# Patient Record
Sex: MALE | Race: WHITE | NOT HISPANIC OR LATINO | Employment: FULL TIME | URBAN - METROPOLITAN AREA
[De-identification: names, ages, dates, MRNs, and addresses within clinical notes are randomized per-mention and may not be internally consistent; named-entity substitution may affect disease eponyms.]

---

## 2021-11-11 ENCOUNTER — TELEPHONE (OUTPATIENT)
Dept: UROLOGY | Facility: CLINIC | Age: 56
End: 2021-11-11
Payer: COMMERCIAL

## 2021-11-18 ENCOUNTER — OFFICE VISIT (OUTPATIENT)
Dept: UROLOGY | Facility: CLINIC | Age: 56
End: 2021-11-18
Payer: COMMERCIAL

## 2021-11-18 VITALS
SYSTOLIC BLOOD PRESSURE: 137 MMHG | DIASTOLIC BLOOD PRESSURE: 81 MMHG | BODY MASS INDEX: 20.7 KG/M2 | HEIGHT: 76 IN | WEIGHT: 170 LBS | HEART RATE: 76 BPM

## 2021-11-18 DIAGNOSIS — R35.0 URINARY FREQUENCY: ICD-10-CM

## 2021-11-18 DIAGNOSIS — R97.20 ELEVATED PSA: Primary | ICD-10-CM

## 2021-11-18 PROCEDURE — 3075F SYST BP GE 130 - 139MM HG: CPT | Mod: CPTII,S$GLB,, | Performed by: UROLOGY

## 2021-11-18 PROCEDURE — 3079F DIAST BP 80-89 MM HG: CPT | Mod: CPTII,S$GLB,, | Performed by: UROLOGY

## 2021-11-18 PROCEDURE — 3079F PR MOST RECENT DIASTOLIC BLOOD PRESSURE 80-89 MM HG: ICD-10-PCS | Mod: CPTII,S$GLB,, | Performed by: UROLOGY

## 2021-11-18 PROCEDURE — 51798 PR MEAS,POST-VOID RES,US,NON-IMAGING: ICD-10-PCS | Mod: S$GLB,,, | Performed by: UROLOGY

## 2021-11-18 PROCEDURE — 3075F PR MOST RECENT SYSTOLIC BLOOD PRESS GE 130-139MM HG: ICD-10-PCS | Mod: CPTII,S$GLB,, | Performed by: UROLOGY

## 2021-11-18 PROCEDURE — 99203 OFFICE O/P NEW LOW 30 MIN: CPT | Mod: S$GLB,,, | Performed by: UROLOGY

## 2021-11-18 PROCEDURE — 99203 PR OFFICE/OUTPT VISIT, NEW, LEVL III, 30-44 MIN: ICD-10-PCS | Mod: S$GLB,,, | Performed by: UROLOGY

## 2021-11-18 PROCEDURE — 1159F MED LIST DOCD IN RCRD: CPT | Mod: CPTII,S$GLB,, | Performed by: UROLOGY

## 2021-11-18 PROCEDURE — 81001 PR  URINALYSIS, AUTO, W/SCOPE: ICD-10-PCS | Mod: S$GLB,,, | Performed by: UROLOGY

## 2021-11-18 PROCEDURE — 3008F PR BODY MASS INDEX (BMI) DOCUMENTED: ICD-10-PCS | Mod: CPTII,S$GLB,, | Performed by: UROLOGY

## 2021-11-18 PROCEDURE — 3008F BODY MASS INDEX DOCD: CPT | Mod: CPTII,S$GLB,, | Performed by: UROLOGY

## 2021-11-18 PROCEDURE — 51798 US URINE CAPACITY MEASURE: CPT | Mod: S$GLB,,, | Performed by: UROLOGY

## 2021-11-18 PROCEDURE — 1159F PR MEDICATION LIST DOCUMENTED IN MEDICAL RECORD: ICD-10-PCS | Mod: CPTII,S$GLB,, | Performed by: UROLOGY

## 2021-11-18 PROCEDURE — 1160F PR REVIEW ALL MEDS BY PRESCRIBER/CLIN PHARMACIST DOCUMENTED: ICD-10-PCS | Mod: CPTII,S$GLB,, | Performed by: UROLOGY

## 2021-11-18 PROCEDURE — 81001 URINALYSIS AUTO W/SCOPE: CPT | Mod: S$GLB,,, | Performed by: UROLOGY

## 2021-11-18 PROCEDURE — 1160F RVW MEDS BY RX/DR IN RCRD: CPT | Mod: CPTII,S$GLB,, | Performed by: UROLOGY

## 2021-11-18 RX ORDER — DEXTROAMPHETAMINE SACCHARATE, AMPHETAMINE ASPARTATE, DEXTROAMPHETAMINE SULFATE AND AMPHETAMINE SULFATE 7.5; 7.5; 7.5; 7.5 MG/1; MG/1; MG/1; MG/1
1 TABLET ORAL 2 TIMES DAILY
COMMUNITY
Start: 2021-11-05

## 2021-11-18 RX ORDER — ZOLPIDEM TARTRATE 10 MG/1
10 TABLET ORAL NIGHTLY
COMMUNITY
Start: 2021-10-14

## 2021-11-18 RX ORDER — OMEPRAZOLE 40 MG/1
CAPSULE, DELAYED RELEASE ORAL
COMMUNITY
Start: 2021-08-12

## 2022-01-19 RX ORDER — CIPROFLOXACIN 500 MG/1
500 TABLET ORAL EVERY 12 HOURS
Qty: 8 TABLET | Refills: 0 | Status: SHIPPED | OUTPATIENT
Start: 2022-01-31 | End: 2022-02-04

## 2022-01-20 ENCOUNTER — TELEPHONE (OUTPATIENT)
Dept: UROLOGY | Facility: CLINIC | Age: 57
End: 2022-01-20
Payer: COMMERCIAL

## 2022-01-20 ENCOUNTER — CLINICAL SUPPORT (OUTPATIENT)
Dept: UROLOGY | Facility: CLINIC | Age: 57
End: 2022-01-20
Payer: COMMERCIAL

## 2022-01-20 DIAGNOSIS — R97.20 ELEVATED PSA: Primary | ICD-10-CM

## 2022-01-20 RX ORDER — DIAZEPAM 10 MG/1
10 TABLET ORAL ONCE
COMMUNITY

## 2022-01-20 NOTE — PROGRESS NOTES
Pre-op instructions and surgery consent reviewed with pt. Pt verbalized understanding. Surgery consent signed by pt. Valium rx given. Take 1 po 20 minutes prior to procedure.

## 2022-01-20 NOTE — TELEPHONE ENCOUNTER
----- Message from Dahlia Hutchinson sent at 1/20/2022  8:05 AM CST -----  Contact: Patient  Please call the patient he want to see if he can come in at 9 am today instead of 8:30 am    Call back #  [phone]

## 2022-01-31 ENCOUNTER — TELEPHONE (OUTPATIENT)
Dept: UROLOGY | Facility: CLINIC | Age: 57
End: 2022-01-31
Payer: COMMERCIAL

## 2022-02-01 ENCOUNTER — PROCEDURE VISIT (OUTPATIENT)
Dept: UROLOGY | Facility: CLINIC | Age: 57
End: 2022-02-01
Payer: COMMERCIAL

## 2022-02-01 DIAGNOSIS — R97.20 ELEVATED PSA: ICD-10-CM

## 2022-02-01 PROCEDURE — 76872 TRANSRECTAL ULTRASOUND W/ BIOPSY: ICD-10-PCS | Mod: S$GLB,,, | Performed by: UROLOGY

## 2022-02-01 PROCEDURE — 55700 TRANSRECTAL ULTRASOUND W/ BIOPSY: CPT | Mod: S$GLB,,, | Performed by: UROLOGY

## 2022-02-01 PROCEDURE — 76872 US TRANSRECTAL: CPT | Mod: S$GLB,,, | Performed by: UROLOGY

## 2022-02-01 PROCEDURE — 55700 TRANSRECTAL ULTRASOUND W/ BIOPSY: ICD-10-PCS | Mod: S$GLB,,, | Performed by: UROLOGY

## 2022-02-01 NOTE — PATIENT INSTRUCTIONS

## 2022-02-01 NOTE — PROCEDURES
"Transrectal Ultrasound w/ Biopsy    Date/Time: 2/1/2022 2:30 PM  Performed by: Alfie Bowers MD  Authorized by: Kelsey Owen NP     Consent Done?:  Yes (Written)  Time out: Immediately prior to procedure a "time out" was called to verify the correct patient, procedure, equipment, support staff and site/side marked as required.    Indications: Elevated PSA    Position:  Left lateral  Anesthesia:  Pudendal nerve block  Patient sedated: No    Total Biopsies:  0    Patient tolerance:  Patient tolerated the procedure well with no immediate complications     Patient was brought to the procedure room placed on the table in left lateral decubitus position lidocaine jelly was instilled into the rectum the ultrasound probe was advanced to level of prostate and a total of 10 cc of lidocaine was injected into the bilateral fauzia prostatic fossa.  A standard 12 core prostate biopsy was obtained patient tolerated the procedure well there were no complications      "

## 2022-02-02 VITALS
RESPIRATION RATE: 20 BRPM | WEIGHT: 159.19 LBS | SYSTOLIC BLOOD PRESSURE: 138 MMHG | OXYGEN SATURATION: 98 % | HEIGHT: 75 IN | DIASTOLIC BLOOD PRESSURE: 67 MMHG | BODY MASS INDEX: 19.79 KG/M2 | HEART RATE: 69 BPM

## 2022-02-04 ENCOUNTER — TELEPHONE (OUTPATIENT)
Dept: UROLOGY | Facility: CLINIC | Age: 57
End: 2022-02-04
Payer: COMMERCIAL

## 2022-02-04 NOTE — TELEPHONE ENCOUNTER
Pt called and informed of benign prostate bx results w/ 1 ASAP area. appt cancelled for tues and r/s to 3mth w/ PSA. Pt verbalized understanding and all questions were answered. lpn

## 2022-05-03 ENCOUNTER — TELEPHONE (OUTPATIENT)
Dept: UROLOGY | Facility: CLINIC | Age: 57
End: 2022-05-03
Payer: COMMERCIAL

## 2022-05-03 NOTE — TELEPHONE ENCOUNTER
Informed lauren 5/5 is for blood draw 5/10 is to see EHR for results. She verbalized understanding. Lpn

## 2022-05-03 NOTE — TELEPHONE ENCOUNTER
----- Message from Dahlia Hutchinson sent at 5/3/2022 12:32 PM CDT -----  Contact: Patient  Patient need the nurse to call .  The patient has some questions about their appointment on 5/5 what is it for      Call back #  [phone] ask for Stephenie

## 2022-05-31 ENCOUNTER — TELEPHONE (OUTPATIENT)
Dept: UROLOGY | Facility: CLINIC | Age: 57
End: 2022-05-31
Payer: COMMERCIAL

## 2022-08-17 ENCOUNTER — CLINICAL SUPPORT (OUTPATIENT)
Dept: UROLOGY | Facility: CLINIC | Age: 57
End: 2022-08-17
Payer: COMMERCIAL

## 2022-08-17 DIAGNOSIS — R97.20 ELEVATED PSA: Primary | ICD-10-CM

## 2022-08-17 LAB — PSA, DIAGNOSTIC: 4.2 NG/ML (ref 0–4)

## 2022-08-18 ENCOUNTER — OFFICE VISIT (OUTPATIENT)
Dept: UROLOGY | Facility: CLINIC | Age: 57
End: 2022-08-18
Payer: COMMERCIAL

## 2022-08-18 VITALS
SYSTOLIC BLOOD PRESSURE: 174 MMHG | BODY MASS INDEX: 19.77 KG/M2 | WEIGHT: 159 LBS | HEIGHT: 75 IN | HEART RATE: 75 BPM | RESPIRATION RATE: 18 BRPM | DIASTOLIC BLOOD PRESSURE: 94 MMHG | TEMPERATURE: 98 F

## 2022-08-18 DIAGNOSIS — R97.20 ELEVATED PSA: Primary | ICD-10-CM

## 2022-08-18 PROCEDURE — 99214 PR OFFICE/OUTPT VISIT, EST, LEVL IV, 30-39 MIN: ICD-10-PCS | Mod: S$GLB,,, | Performed by: UROLOGY

## 2022-08-18 PROCEDURE — 1159F PR MEDICATION LIST DOCUMENTED IN MEDICAL RECORD: ICD-10-PCS | Mod: CPTII,S$GLB,, | Performed by: UROLOGY

## 2022-08-18 PROCEDURE — 99214 OFFICE O/P EST MOD 30 MIN: CPT | Mod: S$GLB,,, | Performed by: UROLOGY

## 2022-08-18 PROCEDURE — 1159F MED LIST DOCD IN RCRD: CPT | Mod: CPTII,S$GLB,, | Performed by: UROLOGY

## 2022-08-18 PROCEDURE — 3077F PR MOST RECENT SYSTOLIC BLOOD PRESSURE >= 140 MM HG: ICD-10-PCS | Mod: CPTII,S$GLB,, | Performed by: UROLOGY

## 2022-08-18 PROCEDURE — 3008F BODY MASS INDEX DOCD: CPT | Mod: CPTII,S$GLB,, | Performed by: UROLOGY

## 2022-08-18 PROCEDURE — 3008F PR BODY MASS INDEX (BMI) DOCUMENTED: ICD-10-PCS | Mod: CPTII,S$GLB,, | Performed by: UROLOGY

## 2022-08-18 PROCEDURE — 1160F RVW MEDS BY RX/DR IN RCRD: CPT | Mod: CPTII,S$GLB,, | Performed by: UROLOGY

## 2022-08-18 PROCEDURE — 3080F PR MOST RECENT DIASTOLIC BLOOD PRESSURE >= 90 MM HG: ICD-10-PCS | Mod: CPTII,S$GLB,, | Performed by: UROLOGY

## 2022-08-18 PROCEDURE — 3077F SYST BP >= 140 MM HG: CPT | Mod: CPTII,S$GLB,, | Performed by: UROLOGY

## 2022-08-18 PROCEDURE — 3080F DIAST BP >= 90 MM HG: CPT | Mod: CPTII,S$GLB,, | Performed by: UROLOGY

## 2022-08-18 PROCEDURE — 1160F PR REVIEW ALL MEDS BY PRESCRIBER/CLIN PHARMACIST DOCUMENTED: ICD-10-PCS | Mod: CPTII,S$GLB,, | Performed by: UROLOGY

## 2022-08-18 NOTE — PROGRESS NOTES
Subjective:       Patient ID: Nain Villareal is a 56 y.o. male.    Chief Complaint: Elevated PSA (Post benign bx 02/2022)      HPI:  56-year-old male with a history of elevated PSA at 4.7 he underwent a biopsy in February of 2022 which was negative.  He is here in follow-up his PSA has declined to 4.2 having no issues    Past Medical History:   Past Medical History:   Diagnosis Date    Elevated PSA        Past Surgical Historical:   Past Surgical History:   Procedure Laterality Date    HERNIA REPAIR          Medications:   Medication List with Changes/Refills   Current Medications    DEXTROAMPHETAMINE-AMPHETAMINE 30 MG TAB    Take 1 tablet by mouth 2 (two) times daily.    DIAZEPAM (VALIUM) 10 MG TAB    Take 10 mg by mouth once. Take 1 po 20 minutes prior to procedure.    OMEPRAZOLE (PRILOSEC) 40 MG CAPSULE        ZOLPIDEM (AMBIEN) 10 MG TAB    Take 10 mg by mouth nightly.        Past Social History:   Social History     Socioeconomic History    Marital status: Single    Number of children: 3   Tobacco Use    Smoking status: Never Smoker    Smokeless tobacco: Current User     Types: Snuff   Substance and Sexual Activity    Alcohol use: Yes    Drug use: Never    Sexual activity: Yes       Allergies: Review of patient's allergies indicates:  No Known Allergies     Family History: History reviewed. No pertinent family history.     Review of Systems:  Review of Systems   Constitutional: Negative for activity change and appetite change.   HENT: Negative for congestion and dental problem.    Eyes: Negative for visual disturbance.   Respiratory: Negative for chest tightness and shortness of breath.    Cardiovascular: Negative for chest pain.   Gastrointestinal: Negative for abdominal distention and abdominal pain.   Genitourinary: Negative for decreased urine volume, difficulty urinating, dysuria, enuresis, flank pain, frequency, genital sores, hematuria, penile discharge, penile pain, penile swelling, scrotal  swelling, testicular pain and urgency.   Musculoskeletal: Negative for back pain and neck pain.   Skin: Negative for color change.   Neurological: Negative for dizziness.   Hematological: Negative for adenopathy.   Psychiatric/Behavioral: Negative for agitation, behavioral problems and confusion.       Physical Exam:  Physical Exam  Constitutional:       General: He is not in acute distress.     Appearance: He is well-developed.   HENT:      Head: Normocephalic and atraumatic.      Nose: Nose normal.   Eyes:      General: No scleral icterus.     Conjunctiva/sclera: Conjunctivae normal.      Pupils: Pupils are equal, round, and reactive to light.   Neck:      Thyroid: No thyromegaly.      Trachea: No tracheal deviation.   Cardiovascular:      Rate and Rhythm: Normal rate and regular rhythm.      Heart sounds: Normal heart sounds.   Pulmonary:      Effort: Pulmonary effort is normal. No respiratory distress.      Breath sounds: Normal breath sounds. No wheezing or rales.   Abdominal:      General: Bowel sounds are normal. There is no distension.      Palpations: Abdomen is soft.      Tenderness: There is no abdominal tenderness. There is no guarding or rebound.   Genitourinary:     Penis: Normal. No tenderness.       Prostate: Normal.   Musculoskeletal:         General: No deformity. Normal range of motion.      Cervical back: Neck supple.   Lymphadenopathy:      Cervical: No cervical adenopathy.   Skin:     General: Skin is warm and dry.      Findings: No erythema or rash.   Neurological:      Mental Status: He is alert and oriented to person, place, and time.      Cranial Nerves: No cranial nerve deficit.   Psychiatric:         Behavior: Behavior normal.         Assessment/Plan:       Problem List Items Addressed This Visit    None     Visit Diagnoses     Elevated PSA    -  Primary             Elevated PSA:   Patient had a negative biopsy in February of this year as well as a decline in his PSA to 4.2 at this point  will hold off on any biopsy return to clinic in 6 months with PSA

## 2023-01-12 ENCOUNTER — OFFICE VISIT (OUTPATIENT)
Dept: UROLOGY | Facility: CLINIC | Age: 58
End: 2023-01-12
Payer: COMMERCIAL

## 2023-01-12 VITALS — HEIGHT: 75 IN | WEIGHT: 159 LBS | BODY MASS INDEX: 19.77 KG/M2 | RESPIRATION RATE: 18 BRPM

## 2023-01-12 DIAGNOSIS — R30.0 DYSURIA: Primary | ICD-10-CM

## 2023-01-12 PROCEDURE — 1160F RVW MEDS BY RX/DR IN RCRD: CPT | Mod: CPTII,S$GLB,, | Performed by: UROLOGY

## 2023-01-12 PROCEDURE — 1159F MED LIST DOCD IN RCRD: CPT | Mod: CPTII,S$GLB,, | Performed by: UROLOGY

## 2023-01-12 PROCEDURE — 1160F PR REVIEW ALL MEDS BY PRESCRIBER/CLIN PHARMACIST DOCUMENTED: ICD-10-PCS | Mod: CPTII,S$GLB,, | Performed by: UROLOGY

## 2023-01-12 PROCEDURE — 99214 OFFICE O/P EST MOD 30 MIN: CPT | Mod: S$GLB,,, | Performed by: UROLOGY

## 2023-01-12 PROCEDURE — 3008F PR BODY MASS INDEX (BMI) DOCUMENTED: ICD-10-PCS | Mod: CPTII,S$GLB,, | Performed by: UROLOGY

## 2023-01-12 PROCEDURE — 99214 PR OFFICE/OUTPT VISIT, EST, LEVL IV, 30-39 MIN: ICD-10-PCS | Mod: S$GLB,,, | Performed by: UROLOGY

## 2023-01-12 PROCEDURE — 3008F BODY MASS INDEX DOCD: CPT | Mod: CPTII,S$GLB,, | Performed by: UROLOGY

## 2023-01-12 PROCEDURE — 1159F PR MEDICATION LIST DOCUMENTED IN MEDICAL RECORD: ICD-10-PCS | Mod: CPTII,S$GLB,, | Performed by: UROLOGY

## 2023-01-12 NOTE — PROGRESS NOTES
Subjective:       Patient ID: Nain Villareal is a 57 y.o. male.    Chief Complaint: Burning      HPI:  57-year-old male for for burning with urination he underwent urinalysis today which revealed 10-20 white blood cells as well as 1+ bacteria in his urine he is had a history of prostate biopsy in February 22 which was negative PSA has been fairly stable    Past Medical History:   Past Medical History:   Diagnosis Date    Elevated PSA        Past Surgical Historical:   Past Surgical History:   Procedure Laterality Date    HERNIA REPAIR          Medications:   Medication List with Changes/Refills   Current Medications    DEXTROAMPHETAMINE-AMPHETAMINE 30 MG TAB    Take 1 tablet by mouth 2 (two) times daily.    DIAZEPAM (VALIUM) 10 MG TAB    Take 10 mg by mouth once. Take 1 po 20 minutes prior to procedure.    OMEPRAZOLE (PRILOSEC) 40 MG CAPSULE        ZOLPIDEM (AMBIEN) 10 MG TAB    Take 10 mg by mouth nightly.        Past Social History:   Social History     Socioeconomic History    Marital status: Single    Number of children: 3   Tobacco Use    Smoking status: Never    Smokeless tobacco: Current     Types: Snuff   Substance and Sexual Activity    Alcohol use: Yes    Drug use: Never    Sexual activity: Yes       Allergies: Review of patient's allergies indicates:  No Known Allergies     Family History: History reviewed. No pertinent family history.     Review of Systems:  Review of Systems   Constitutional:  Negative for activity change and appetite change.   HENT:  Negative for congestion and dental problem.    Eyes:  Negative for visual disturbance.   Respiratory:  Negative for chest tightness and shortness of breath.    Cardiovascular:  Negative for chest pain.   Gastrointestinal:  Negative for abdominal distention and abdominal pain.   Endocrine: Negative for polyuria.   Genitourinary:  Negative for difficulty urinating, dysuria, flank pain, frequency, hematuria, penile discharge, penile pain, penile swelling,  scrotal swelling, testicular pain and urgency.   Musculoskeletal:  Negative for back pain and neck pain.   Skin:  Negative for color change.   Allergic/Immunologic: Positive for immunocompromised state.   Neurological:  Negative for dizziness.   Hematological:  Negative for adenopathy.   Psychiatric/Behavioral:  Negative for agitation, behavioral problems and confusion.      Physical Exam:  Physical Exam  Constitutional:       General: He is not in acute distress.     Appearance: He is well-developed.   HENT:      Head: Normocephalic and atraumatic.      Nose: Nose normal.   Eyes:      General: No scleral icterus.     Conjunctiva/sclera: Conjunctivae normal.      Pupils: Pupils are equal, round, and reactive to light.   Neck:      Thyroid: No thyromegaly.      Trachea: No tracheal deviation.   Cardiovascular:      Rate and Rhythm: Normal rate and regular rhythm.      Heart sounds: Normal heart sounds.   Pulmonary:      Effort: Pulmonary effort is normal. No respiratory distress.      Breath sounds: Normal breath sounds. No wheezing or rales.   Abdominal:      General: Bowel sounds are normal. There is no distension.      Palpations: Abdomen is soft.      Tenderness: There is no abdominal tenderness. There is no guarding or rebound.   Genitourinary:     Penis: Normal. No tenderness.       Prostate: Normal.   Musculoskeletal:         General: No deformity. Normal range of motion.      Cervical back: Neck supple.   Lymphadenopathy:      Cervical: No cervical adenopathy.   Skin:     General: Skin is warm and dry.      Findings: No erythema or rash.   Neurological:      Mental Status: He is alert and oriented to person, place, and time.      Cranial Nerves: No cranial nerve deficit.   Psychiatric:         Behavior: Behavior normal.       Assessment/Plan:       Problem List Items Addressed This Visit    None         Burning with urination appears patient may have UTI we will culture urine send out antibiotics as  needed

## 2023-01-30 ENCOUNTER — PATIENT MESSAGE (OUTPATIENT)
Dept: UROLOGY | Facility: CLINIC | Age: 58
End: 2023-01-30
Payer: COMMERCIAL

## 2024-05-29 DIAGNOSIS — R97.20 PSA ELEVATION: Primary | ICD-10-CM

## 2024-06-06 ENCOUNTER — OFFICE VISIT (OUTPATIENT)
Dept: UROLOGY | Facility: CLINIC | Age: 59
End: 2024-06-06
Payer: COMMERCIAL

## 2024-06-06 VITALS
SYSTOLIC BLOOD PRESSURE: 136 MMHG | BODY MASS INDEX: 19.76 KG/M2 | WEIGHT: 158.94 LBS | DIASTOLIC BLOOD PRESSURE: 74 MMHG | OXYGEN SATURATION: 100 % | HEIGHT: 75 IN | HEART RATE: 80 BPM

## 2024-06-06 DIAGNOSIS — N41.9 PROSTATITIS, UNSPECIFIED PROSTATITIS TYPE: Primary | ICD-10-CM

## 2024-06-06 LAB
BILIRUBIN, UA POC OHS: NEGATIVE
BLOOD, UA POC OHS: ABNORMAL
CLARITY, UA POC OHS: CLEAR
COLOR, UA POC OHS: YELLOW
GLUCOSE, UA POC OHS: NEGATIVE
KETONES, UA POC OHS: NEGATIVE
LEUKOCYTES, UA POC OHS: NEGATIVE
NITRITE, UA POC OHS: NEGATIVE
PH, UA POC OHS: 6
PROTEIN, UA POC OHS: 100
SPECIFIC GRAVITY, UA POC OHS: >=1.03
UROBILINOGEN, UA POC OHS: 1

## 2024-06-06 PROCEDURE — 1160F RVW MEDS BY RX/DR IN RCRD: CPT | Mod: CPTII,S$GLB,,

## 2024-06-06 PROCEDURE — 3008F BODY MASS INDEX DOCD: CPT | Mod: CPTII,S$GLB,,

## 2024-06-06 PROCEDURE — 1159F MED LIST DOCD IN RCRD: CPT | Mod: CPTII,S$GLB,,

## 2024-06-06 PROCEDURE — 81003 URINALYSIS AUTO W/O SCOPE: CPT | Mod: QW,S$GLB,,

## 2024-06-06 PROCEDURE — 99214 OFFICE O/P EST MOD 30 MIN: CPT | Mod: S$GLB,,,

## 2024-06-06 PROCEDURE — 3078F DIAST BP <80 MM HG: CPT | Mod: CPTII,S$GLB,,

## 2024-06-06 PROCEDURE — 3075F SYST BP GE 130 - 139MM HG: CPT | Mod: CPTII,S$GLB,,

## 2024-06-06 RX ORDER — SULFAMETHOXAZOLE AND TRIMETHOPRIM 800; 160 MG/1; MG/1
1 TABLET ORAL 2 TIMES DAILY
Qty: 60 TABLET | Refills: 0 | Status: SHIPPED | OUTPATIENT
Start: 2024-06-06 | End: 2024-07-06

## 2024-06-06 NOTE — PROGRESS NOTES
Subjective:       Patient ID: Nain Villareal is a 58 y.o. male.    Chief Complaint: Elevated PSA      HPI: 58-year-old male established patient presents today due to elevated PSA.  Patient has a history of elevated PSA with a benign biopsy in February 2022.     PSA:   7/2021 3.28  10/2021 4.7  2/2022 Benign biopsy  8/2022 4.2   10/2023 6.040  12/2023 4.270  5/2024 4.110    Today patient reports he is having some intermittent rectal pressure, dysuria, and burning with ejaculation.  He does report he on occasion has a pulling sensation with swelling to his testicles.  He reports he performs self exams of his testicles and has not noticed any masses or changes to his skin/texture.  Patient denies urinary frequency, urgency, gross hematuria, fever or chills.  He does report he experiences intermittent nocturia once or twice per night but this is usually followed after drinking alcohol.  He reports his stream is strong from start to finish and he does not require straining to void.  Patient denies a family history of prostate cancer and he denies new bone or muscle pains.    Patient also had some trace blood in his urine.  He reports a family history of renal cell carcinoma in his uncle requiring nephrectomy.  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    DIAZEPAM (VALIUM) 10 MG TAB    Take 10 mg by mouth once. Take 1 po 20 minutes prior to procedure.    OMEPRAZOLE (PRILOSEC) 40 MG CAPSULE        ZOLPIDEM (AMBIEN) 10 MG TAB    Take 10 mg by mouth nightly.        Past  Social History:   Social History     Socioeconomic History    Marital status: Single    Number of children: 3   Tobacco Use    Smoking status: Never    Smokeless tobacco: Current     Types: Snuff   Substance and Sexual Activity    Alcohol use: Yes    Drug use: Never    Sexual activity: Yes       Allergies: Review of patient's allergies indicates:  No Known Allergies     Family History: No family history on file.     Review of Systems:  Review of Systems   Gastrointestinal:  Positive for rectal pain (Rectal pressure).   Genitourinary:  Positive for dysuria and scrotal swelling (Intermittent).        Burning with ejaculation     Physical Exam:  Physical Exam  Constitutional:       Appearance: Normal appearance.   Cardiovascular:      Rate and Rhythm: Normal rate.   Pulmonary:      Effort: Pulmonary effort is normal.   Abdominal:      General: Bowel sounds are normal.      Palpations: Abdomen is soft.   Genitourinary:     Comments: Deferred RICHARD, reports his primary care doctor does this yearly  Neurological:      Mental Status: He is alert and oriented to person, place, and time.   Urinalysis:  WBCs 0-5, RBCs 3-5 trace, epithelial negative, bacteria negative, mucus +1, 3+ sperm  Assessment/Plan:     Elevated PSA: Had discussion with patient that his PSA levels are at his baseline based on his age and previous biopsy.  Patient would like to continue monitoring these levels every 6 months     Prostatitis:  We will treat patient with 30 days of Bactrim.      Discussed with patient that after treatment for prostatitis if he remains having microscopic hematuria we will discuss CT urogram with cystoscopy in the future.      Follow up in 6 weeks  Problem List Items Addressed This Visit    None  Visit Diagnoses       Prostatitis, unspecified prostatitis type    -  Primary

## 2024-07-18 ENCOUNTER — OFFICE VISIT (OUTPATIENT)
Dept: UROLOGY | Facility: CLINIC | Age: 59
End: 2024-07-18
Payer: COMMERCIAL

## 2024-07-18 VITALS
SYSTOLIC BLOOD PRESSURE: 124 MMHG | HEIGHT: 75 IN | RESPIRATION RATE: 12 BRPM | BODY MASS INDEX: 19.76 KG/M2 | DIASTOLIC BLOOD PRESSURE: 72 MMHG | HEART RATE: 62 BPM | WEIGHT: 158.94 LBS

## 2024-07-18 DIAGNOSIS — R31.21 ASYMPTOMATIC MICROSCOPIC HEMATURIA: ICD-10-CM

## 2024-07-18 DIAGNOSIS — R82.90 ABNORMAL URINALYSIS: Primary | ICD-10-CM

## 2024-07-18 LAB
BILIRUBIN, UA POC OHS: NEGATIVE
BLOOD, UA POC OHS: ABNORMAL
CLARITY, UA POC OHS: CLEAR
COLOR, UA POC OHS: YELLOW
GLUCOSE, UA POC OHS: NEGATIVE
KETONES, UA POC OHS: NEGATIVE
LEUKOCYTES, UA POC OHS: NEGATIVE
NITRITE, UA POC OHS: NEGATIVE
PH, UA POC OHS: 6
PROTEIN, UA POC OHS: NEGATIVE
SPECIFIC GRAVITY, UA POC OHS: >=1.03
UROBILINOGEN, UA POC OHS: 1

## 2024-07-18 PROCEDURE — 99214 OFFICE O/P EST MOD 30 MIN: CPT | Mod: S$GLB,,,

## 2024-07-18 PROCEDURE — 3008F BODY MASS INDEX DOCD: CPT | Mod: CPTII,S$GLB,,

## 2024-07-18 PROCEDURE — 3074F SYST BP LT 130 MM HG: CPT | Mod: CPTII,S$GLB,,

## 2024-07-18 PROCEDURE — 1160F RVW MEDS BY RX/DR IN RCRD: CPT | Mod: CPTII,S$GLB,,

## 2024-07-18 PROCEDURE — 81003 URINALYSIS AUTO W/O SCOPE: CPT | Mod: QW,S$GLB,,

## 2024-07-18 PROCEDURE — 1159F MED LIST DOCD IN RCRD: CPT | Mod: CPTII,S$GLB,,

## 2024-07-18 PROCEDURE — 3078F DIAST BP <80 MM HG: CPT | Mod: CPTII,S$GLB,,

## 2024-07-18 RX ORDER — DEXTROAMPHETAMINE SACCHARATE, AMPHETAMINE ASPARTATE, DEXTROAMPHETAMINE SULFATE AND AMPHETAMINE SULFATE 5; 5; 5; 5 MG/1; MG/1; MG/1; MG/1
1 TABLET ORAL 2 TIMES DAILY
COMMUNITY
Start: 2024-06-17

## 2024-07-18 RX ORDER — EPINEPHRINE 0.3 MG/.3ML
INJECTION SUBCUTANEOUS
COMMUNITY
Start: 2024-06-25

## 2024-07-18 RX ORDER — FLUCONAZOLE 200 MG/1
200 TABLET ORAL DAILY
Qty: 2 TABLET | Refills: 0 | Status: SHIPPED | OUTPATIENT
Start: 2024-07-18 | End: 2024-07-20

## 2024-07-18 NOTE — PROGRESS NOTES
Subjective:       Patient ID: Nain Villareal is a 58 y.o. male.    Chief Complaint: Follow-up (Abc/hematuria)      HPI: 58-year-old male established patient presents today for follow up of prostatitis.  Patient previously was experiencing some significant dysuria as well as perineal pain and painful ejaculation.  Patient was started on Bactrim times 30 days.  He reports today that his symptoms have resolved with the exception of occasional dysuria.  His urinalysis today reveals WBCs 6-10, RBCs 2-4 trace, epithelial +1, bacteria +1, mucus +1, nitrite negative.    Patient also had some trace blood in his urine.  He reports a family history of renal cell carcinoma in his uncle requiring nephrectomy.  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  Patient did have trace microscopic blood in his previous urinalysis however that was in the presence of infection.       Past Medical History:   Past Medical History:   Diagnosis Date    ADD (attention deficit disorder)     Elevated PSA     GERD (gastroesophageal reflux disease)     Insomnia     Prostatitis        Past Surgical Historical:   Past Surgical History:   Procedure Laterality Date    COLONOSCOPY      HERNIA REPAIR      x2    PROSTATE SURGERY      bx        Medications:   Medication List with Changes/Refills   New Medications    FLUCONAZOLE (DIFLUCAN) 200 MG TAB    Take 1 tablet (200 mg total) by mouth once daily. for 2 doses   Current Medications    DEXTROAMPHETAMINE-AMPHETAMINE (ADDERALL) 20 MG TABLET    Take 1 tablet by mouth 2 (two) times daily.    EPINEPHRINE (EPIPEN) 0.3 MG/0.3 ML ATIN    INJECT 1 PEN IN THE MUSCLE ONE TIME AS DIRECTED    OMEPRAZOLE (PRILOSEC) 40 MG CAPSULE        ZOLPIDEM (AMBIEN) 10 MG TAB    Take 10 mg by mouth nightly.   Discontinued Medications    DEXTROAMPHETAMINE-AMPHETAMINE 30 MG TAB    Take 1 tablet by mouth 2 (two) times daily.    DIAZEPAM  (VALIUM) 10 MG TAB    Take 10 mg by mouth once. Take 1 po 20 minutes prior to procedure.        Past Social History:   Social History     Socioeconomic History    Marital status:     Number of children: 3   Tobacco Use    Smoking status: Never    Smokeless tobacco: Current     Types: Snuff   Substance and Sexual Activity    Alcohol use: Yes    Drug use: Never    Sexual activity: Yes       Allergies:   Review of patient's allergies indicates:   Allergen Reactions    Bee sting [allergen ext-venom-honey bee] Shortness Of Breath and Swelling     Wasp, etc, if stung more then once        Family History:   Family History   Problem Relation Name Age of Onset    No Known Problems Father      No Known Problems Mother      Kidney cancer Paternal Uncle          Review of Systems:  Review of Systems   Constitutional: Negative.    HENT: Negative.     Eyes: Negative.    Respiratory: Negative.     Cardiovascular: Negative.    Gastrointestinal: Negative.    Endocrine: Negative.    Genitourinary:  Positive for dysuria (Occasional).   Musculoskeletal: Negative.    Skin: Negative.    Allergic/Immunologic: Negative.    Neurological: Negative.    Hematological: Negative.    Psychiatric/Behavioral: Negative.       Physical Exam:  Physical Exam  Constitutional:       Appearance: Normal appearance.   Cardiovascular:      Rate and Rhythm: Normal rate.   Pulmonary:      Effort: Pulmonary effort is normal.   Abdominal:      General: Bowel sounds are normal.      Palpations: Abdomen is soft.   Genitourinary:     Penis: Normal.    Neurological:      Mental Status: He is alert and oriented to person, place, and time.   Urinalysis:  WBCs 6-10, RBCs 2-4 trace, epithelial +1, bacteria +1, mucus +1, nitrite negative  Assessment/Plan:     Abnormal urinalysis/microscopic hematuria:  Patient is completely asymptomatic of any lower urinary tract symptoms with the exception of occasional dysuria.  I suspect this may be contamination or possibly  secondary to some Candida from 30 days of antibiotics.  We will give him 2 doses of Diflucan.  Instructed patient that if he continues to have occasional dysuria to return to clinic for further evaluation.  Patient has had 2 urinalysis revealing microscopic hematuria and has a positive family history with risk factors.  We will see patient again in 4 months for repeat urinalysis and evaluation of continued microscopic hematuria.  At that time if patient continues to have trace amounts of blood we will do a CT urogram and cystoscopy with Dr. Bowers    Follow up in 4 months  Problem List Items Addressed This Visit    None  Visit Diagnoses       Abnormal urinalysis    -  Primary    Relevant Medications    fluconazole (DIFLUCAN) 200 MG Tab    Other Relevant Orders    Urine culture    Asymptomatic microscopic hematuria

## 2024-11-21 ENCOUNTER — OFFICE VISIT (OUTPATIENT)
Dept: UROLOGY | Facility: CLINIC | Age: 59
End: 2024-11-21
Payer: COMMERCIAL

## 2024-11-21 VITALS
OXYGEN SATURATION: 98 % | DIASTOLIC BLOOD PRESSURE: 78 MMHG | SYSTOLIC BLOOD PRESSURE: 120 MMHG | HEIGHT: 75 IN | BODY MASS INDEX: 19.87 KG/M2

## 2024-11-21 DIAGNOSIS — R31.21 ASYMPTOMATIC MICROSCOPIC HEMATURIA: Primary | ICD-10-CM

## 2024-11-21 DIAGNOSIS — R97.20 PSA ELEVATION: ICD-10-CM

## 2024-11-21 LAB
BILIRUBIN, UA POC OHS: NEGATIVE
BLOOD, UA POC OHS: NEGATIVE
CLARITY, UA POC OHS: CLEAR
COLOR, UA POC OHS: YELLOW
GLUCOSE, UA POC OHS: NEGATIVE
KETONES, UA POC OHS: NEGATIVE
LEUKOCYTES, UA POC OHS: ABNORMAL
NITRITE, UA POC OHS: NEGATIVE
PH, UA POC OHS: 6.5
PROTEIN, UA POC OHS: NEGATIVE
SPECIFIC GRAVITY, UA POC OHS: 1.02
UROBILINOGEN, UA POC OHS: 1

## 2024-11-21 PROCEDURE — 3074F SYST BP LT 130 MM HG: CPT | Mod: CPTII,,,

## 2024-11-21 PROCEDURE — 99213 OFFICE O/P EST LOW 20 MIN: CPT | Mod: S$PBB,,,

## 2024-11-21 PROCEDURE — 1159F MED LIST DOCD IN RCRD: CPT | Mod: CPTII,,,

## 2024-11-21 PROCEDURE — 3078F DIAST BP <80 MM HG: CPT | Mod: CPTII,,,

## 2024-11-21 PROCEDURE — 1160F RVW MEDS BY RX/DR IN RCRD: CPT | Mod: CPTII,,,

## 2024-11-21 PROCEDURE — 3008F BODY MASS INDEX DOCD: CPT | Mod: CPTII,,,

## 2024-11-21 RX ORDER — OMEPRAZOLE 40 MG/1
1 CAPSULE, DELAYED RELEASE ORAL DAILY
COMMUNITY
Start: 2024-08-25

## 2024-11-21 RX ORDER — ZOLPIDEM TARTRATE 10 MG/1
1 TABLET ORAL NIGHTLY
COMMUNITY
Start: 2024-10-07

## 2024-11-21 RX ORDER — TADALAFIL 20 MG/1
1 TABLET ORAL DAILY
COMMUNITY
Start: 2024-09-05

## 2024-11-21 RX ORDER — MUPIROCIN 20 MG/G
OINTMENT TOPICAL
COMMUNITY
Start: 2024-09-23

## 2024-11-21 RX ORDER — DEXTROAMPHETAMINE SACCHARATE, AMPHETAMINE ASPARTATE, DEXTROAMPHETAMINE SULFATE AND AMPHETAMINE SULFATE 5; 5; 5; 5 MG/1; MG/1; MG/1; MG/1
20 TABLET ORAL
COMMUNITY
Start: 2024-11-14

## 2024-11-21 RX ORDER — METHYLPREDNISOLONE 4 MG/1
TABLET ORAL
COMMUNITY
Start: 2024-11-08

## 2024-11-21 NOTE — PROGRESS NOTES
Subjective:       Patient ID: Nain Villareal is a 59 y.o. male.    Chief Complaint: No chief complaint on file.      HPI: 59-year-old male established patient presents today for 4 month follow up.  Patient previously was experiencing some microscopic hematuria however he had also been being treated for prostatitis.  He also had risk factors as well as a positive family history of kidney cancer.  He determined we would repeat his urinalysis and if still having persistent microscopic hematuria he would undergo CT urogram and cystoscopy.    Patient also has history of slightly elevated PSA level.  He had a biopsy several years ago with a different urologists and reports it was benign.  Two years ago in 2022 was 4.200.  Then he also recently had 1 in July which resulted at 4.11.  He denies lower urinary tract symptoms today.  He denies a known family history of prostate cancer.       Past Medical History:   Past Medical History:   Diagnosis Date    ADD (attention deficit disorder)     Elevated PSA     GERD (gastroesophageal reflux disease)     Insomnia     Prostatitis        Past Surgical Historical:   Past Surgical History:   Procedure Laterality Date    COLONOSCOPY      HERNIA REPAIR      x2    PROSTATE SURGERY      bx        Medications:   Medication List with Changes/Refills   Current Medications    DEXTROAMPHETAMINE-AMPHETAMINE (ADDERALL) 20 MG TABLET    Take 1 tablet by mouth 2 (two) times daily.    DEXTROAMPHETAMINE-AMPHETAMINE (ADDERALL) 20 MG TABLET    Take 20 mg by mouth.    EPINEPHRINE (EPIPEN) 0.3 MG/0.3 ML ATIN    INJECT 1 PEN IN THE MUSCLE ONE TIME AS DIRECTED    METHYLPREDNISOLONE (MEDROL DOSEPACK) 4 MG TABLET    FOLLOW PACKAGE DIRECTIONS    MUPIROCIN (BACTROBAN) 2 % OINTMENT        OMEPRAZOLE (PRILOSEC) 40 MG CAPSULE        OMEPRAZOLE (PRILOSEC) 40 MG CAPSULE    Take 1 capsule by mouth once daily.    TADALAFIL (CIALIS) 20 MG TAB    Take 1 tablet by mouth once daily.    ZOLPIDEM (AMBIEN) 10 MG TAB    Take  10 mg by mouth nightly.    ZOLPIDEM (AMBIEN) 10 MG TAB    Take 1 tablet by mouth every evening.        Past Social History:   Social History     Socioeconomic History    Marital status:     Number of children: 3   Tobacco Use    Smoking status: Never    Smokeless tobacco: Current     Types: Snuff   Substance and Sexual Activity    Alcohol use: Yes    Drug use: Never    Sexual activity: Yes       Allergies:   Review of patient's allergies indicates:   Allergen Reactions    Bee sting [allergen ext-venom-honey bee] Shortness Of Breath and Swelling     Wasp, etc, if stung more then once        Family History:   Family History   Problem Relation Name Age of Onset    No Known Problems Father      No Known Problems Mother      Kidney cancer Paternal Uncle          Review of Systems:  Review of Systems   Constitutional: Negative.    HENT: Negative.     Eyes: Negative.    Respiratory: Negative.     Cardiovascular: Negative.    Gastrointestinal: Negative.    Endocrine: Negative.    Genitourinary: Negative.    Musculoskeletal: Negative.    Skin: Negative.    Allergic/Immunologic: Negative.    Neurological: Negative.    Hematological: Negative.    Psychiatric/Behavioral: Negative.       Physical Exam:  Physical Exam  Constitutional:       Appearance: Normal appearance.   Cardiovascular:      Rate and Rhythm: Normal rate.   Pulmonary:      Effort: Pulmonary effort is normal.   Abdominal:      General: Bowel sounds are normal.      Palpations: Abdomen is soft.   Genitourinary:     Comments: Deferred RICHARD  Neurological:      Mental Status: He is alert and oriented to person, place, and time.   Urinalysis: WBCs 15-20, RBCs negative, epithelial +1, bacteria +1, mucus +1, nitrite negative, sperm +1    PVR 36 mL  Assessment/Plan:     Elevated PSA:  Patient has slightly elevated PSA level.  We will draw PSA level today and call him with the results.  Discussed with patient that as long as his PSA level remains near its baseline  of the previous few draw falls we would extend his periodic monitoring to every 6 months.     Asymptomatic microscopic hematuria:  Patient previously was experiencing some persistent microscopic hematuria however he also had recently undergone treatment for prostatitis and then yeast.  His urinalysis today is negative for microscopic hematuria    Follow up in 6 months  Problem List Items Addressed This Visit    None  Visit Diagnoses       PSA elevation    -  Primary    Relevant Orders    Prostate Specific Antigen, Diagnostic    POCT Urinalysis(Instrument)    Asymptomatic microscopic hematuria

## 2024-11-22 LAB — PSA, DIAGNOSTIC: 4.88 NG/ML (ref 0.1–4)

## 2024-11-23 LAB — URINE CULTURE, ROUTINE: NORMAL

## 2024-11-25 ENCOUNTER — TELEPHONE (OUTPATIENT)
Dept: UROLOGY | Facility: CLINIC | Age: 59
End: 2024-11-25
Payer: COMMERCIAL

## 2024-11-25 NOTE — TELEPHONE ENCOUNTER
----- Message from Marisol Casanova NP sent at 11/25/2024  7:54 AM CST -----  Please call and inform patient his PSA has slightly increased to 4.88.  If he would like to undergo an MRI I can place the order or if he would like to just continue monitoring his PSA levels more frequently we can set him up for a repeat PSA in 4 months

## 2024-11-26 ENCOUNTER — TELEPHONE (OUTPATIENT)
Dept: UROLOGY | Facility: CLINIC | Age: 59
End: 2024-11-26
Payer: COMMERCIAL

## 2024-11-26 DIAGNOSIS — R97.20 ELEVATED PSA: Primary | ICD-10-CM

## 2024-12-04 ENCOUNTER — PATIENT MESSAGE (OUTPATIENT)
Dept: UROLOGY | Facility: CLINIC | Age: 59
End: 2024-12-04
Payer: COMMERCIAL

## 2025-01-07 ENCOUNTER — PATIENT MESSAGE (OUTPATIENT)
Dept: UROLOGY | Facility: CLINIC | Age: 60
End: 2025-01-07
Payer: COMMERCIAL

## 2025-02-10 ENCOUNTER — OUTSIDE PLACE OF SERVICE (OUTPATIENT)
Dept: PULMONOLOGY | Facility: CLINIC | Age: 60
End: 2025-02-10
Payer: COMMERCIAL

## 2025-05-07 DIAGNOSIS — R97.20 ELEVATED PSA: Primary | ICD-10-CM

## 2025-05-21 ENCOUNTER — RESULTS FOLLOW-UP (OUTPATIENT)
Dept: UROLOGY | Facility: CLINIC | Age: 60
End: 2025-05-21

## 2025-05-21 ENCOUNTER — CLINICAL SUPPORT (OUTPATIENT)
Dept: FAMILY MEDICINE | Facility: CLINIC | Age: 60
End: 2025-05-21
Payer: COMMERCIAL

## 2025-05-21 DIAGNOSIS — R97.20 ELEVATED PSA: ICD-10-CM

## 2025-05-21 LAB — PSA, DIAGNOSTIC: 4.72 NG/ML (ref 0–4)

## 2025-06-03 ENCOUNTER — OFFICE VISIT (OUTPATIENT)
Dept: UROLOGY | Facility: CLINIC | Age: 60
End: 2025-06-03
Payer: COMMERCIAL

## 2025-06-03 VITALS
DIASTOLIC BLOOD PRESSURE: 87 MMHG | WEIGHT: 170 LBS | SYSTOLIC BLOOD PRESSURE: 189 MMHG | HEIGHT: 75 IN | HEART RATE: 75 BPM | BODY MASS INDEX: 21.14 KG/M2

## 2025-06-03 DIAGNOSIS — R97.20 ELEVATED PSA: Primary | ICD-10-CM

## 2025-06-03 LAB
BILIRUBIN, UA POC OHS: NEGATIVE
BLOOD, UA POC OHS: ABNORMAL
CLARITY, UA POC OHS: ABNORMAL
COLOR, UA POC OHS: YELLOW
GLUCOSE, UA POC OHS: NEGATIVE
KETONES, UA POC OHS: NEGATIVE
LEUKOCYTES, UA POC OHS: NEGATIVE
NITRITE, UA POC OHS: NEGATIVE
PH, UA POC OHS: 6.5
PROTEIN, UA POC OHS: 100
SPECIFIC GRAVITY, UA POC OHS: >=1.03
UROBILINOGEN, UA POC OHS: 0.2

## 2025-06-03 PROCEDURE — 99214 OFFICE O/P EST MOD 30 MIN: CPT | Mod: S$PBB,,, | Performed by: UROLOGY

## 2025-06-03 PROCEDURE — 1159F MED LIST DOCD IN RCRD: CPT | Mod: CPTII,,, | Performed by: UROLOGY

## 2025-06-03 PROCEDURE — 3079F DIAST BP 80-89 MM HG: CPT | Mod: CPTII,,, | Performed by: UROLOGY

## 2025-06-03 PROCEDURE — 3008F BODY MASS INDEX DOCD: CPT | Mod: CPTII,,, | Performed by: UROLOGY

## 2025-06-03 PROCEDURE — 1160F RVW MEDS BY RX/DR IN RCRD: CPT | Mod: CPTII,,, | Performed by: UROLOGY

## 2025-06-03 PROCEDURE — 3077F SYST BP >= 140 MM HG: CPT | Mod: CPTII,,, | Performed by: UROLOGY

## 2025-06-03 RX ORDER — ATORVASTATIN CALCIUM 40 MG/1
40 TABLET, FILM COATED ORAL NIGHTLY
COMMUNITY